# Patient Record
(demographics unavailable — no encounter records)

---

## 2024-11-05 NOTE — HISTORY OF PRESENT ILLNESS
[Currently In Menopause] : currently in menopause [Yes] : Patient has concerns regarding sex [Currently Active] : currently active [TextBox_4] : Dennys is a 58 y/o  who presents today for an annual exam. She has c/o pelvic pain that became more consistent about 2 months ago.  She states the pain, at it's worst is 5/10. She denies any abnormal vaginal discharge or odor.  At her annual last year she states rheum was going to order repeat DEXA as last was 2022 and noted osteoporosis. she states she has not yet had this but remains on Prolia with rheum [Mammogramdate] : 9/23/22 [ColonoscopyDate] : 1/2023 [TextBox_43] : REPEAT DUE SOON [FreeTextEntry1] : PAIN

## 2024-11-05 NOTE — DISCUSSION/SUMMARY
[FreeTextEntry1] : 1) pap performed 2) refill for vaginal estrogen issued with isntructions changed to 2-3x per week vs. 1-2x 3) rx for tV sono issued for eval of pelvic pain 4) rx for screening mammo issued  Return to office in one year, sooner prn

## 2024-11-05 NOTE — PHYSICAL EXAM
[Appropriately responsive] : appropriately responsive [Alert] : alert [No Acute Distress] : no acute distress [No Lymphadenopathy] : no lymphadenopathy [Oriented x3] : oriented x3 [Examination Of The Breasts] : a normal appearance [No Discharge] : no discharge [No Masses] : no breast masses were palpable [Vulvar Atrophy] : vulvar atrophy [Labia Majora] : normal [Labia Minora] : normal [Atrophy] : atrophy [Normal] : normal [Uterine Adnexae] : normal

## 2025-01-07 NOTE — REVIEW OF SYSTEMS
[Recent Weight Loss (___ Lbs)] : recent [unfilled] ~Ulb weight loss [Heart Rate Is Slow] : slow heart rate [Chest Pain] : chest pain [Shortness Of Breath] : shortness of breath [Orthopnea] : orthopnea [Abdominal Pain] : abdominal pain [Constipation] : constipation [Diarrhea] : diarrhea [Heartburn] : heartburn [Dysuria] : dysuria [Pelvic Pain] : pelvic pain [As Noted in HPI] : as noted in HPI [Arthralgias] : arthralgias [Joint Pain] : joint pain [Joint Stiffness] : joint stiffness [Difficulty Walking] : difficulty walking [Sleep Disturbances] : sleep disturbances [Anxiety] : anxiety [Depression] : depression [Muscle Weakness] : muscle weakness [Negative] : Psychiatric [Fever] : no fever [Chills] : no chills [Eye Pain] : no eye pain [Loss Of Hearing] : no hearing loss [Palpitations] : no palpitations [Cough] : no cough [SOB on Exertion] : no shortness of breath during exertion [Joint Swelling] : no joint swelling [Skin Lesions] : no skin lesions [Limb Weakness] : no limb weakness [Easy Bruising] : no tendency for easy bruising [FreeTextEntry2] : 86 lb weight loss since April 2022 intentional -- l - wt stable [FreeTextEntry3] : denies inflammatory eye [FreeTextEntry5] : ?? CHB, need to review ECG- CV w/u in progress [FreeTextEntry6] : chronic desaturation while sleeping, requires HS O2 - not discussed today [FreeTextEntry7] : abdominal pain still present in LUQ, but is significantly better than previously- RESOLVED- and now reports BRBPR  [FreeTextEntry8] : renal calculi and dyspareunia - not discussed  [de-identified] : dry cracked skin on finger tips / and feet.. intermittently- NO psoriasis.. no nail pitting, mild onycholysis, and hyperkeratosis++ worse in feet  [FreeTextEntry9] : NEW R 4PIP dactylitis only here appreciated though continues pain "everywhere"  [de-identified] : due to pain, balance issues

## 2025-01-07 NOTE — REVIEW OF SYSTEMS
[Recent Weight Loss (___ Lbs)] : recent [unfilled] ~Ulb weight loss [Heart Rate Is Slow] : slow heart rate [Chest Pain] : chest pain [Shortness Of Breath] : shortness of breath [Orthopnea] : orthopnea [Abdominal Pain] : abdominal pain [Constipation] : constipation [Diarrhea] : diarrhea [Heartburn] : heartburn [Dysuria] : dysuria [Pelvic Pain] : pelvic pain [As Noted in HPI] : as noted in HPI [Arthralgias] : arthralgias [Joint Pain] : joint pain [Joint Stiffness] : joint stiffness [Difficulty Walking] : difficulty walking [Sleep Disturbances] : sleep disturbances [Anxiety] : anxiety [Depression] : depression [Muscle Weakness] : muscle weakness [Negative] : Psychiatric [Fever] : no fever [Chills] : no chills [Eye Pain] : no eye pain [Loss Of Hearing] : no hearing loss [Palpitations] : no palpitations [Cough] : no cough [SOB on Exertion] : no shortness of breath during exertion [Joint Swelling] : no joint swelling [Skin Lesions] : no skin lesions [Limb Weakness] : no limb weakness [Easy Bruising] : no tendency for easy bruising [FreeTextEntry2] : 86 lb weight loss since April 2022 intentional -- l - wt stable [FreeTextEntry3] : denies inflammatory eye [FreeTextEntry5] : ?? CHB, need to review ECG- CV w/u in progress [FreeTextEntry6] : chronic desaturation while sleeping, requires HS O2 - not discussed today [FreeTextEntry7] : abdominal pain still present in LUQ, but is significantly better than previously- RESOLVED- and now reports BRBPR  [FreeTextEntry8] : renal calculi and dyspareunia - not discussed  [FreeTextEntry9] : NEW R 4PIP dactylitis only here appreciated though continues pain "everywhere"  [de-identified] : dry cracked skin on finger tips / and feet.. intermittently- NO psoriasis.. no nail pitting, mild onycholysis, and hyperkeratosis++ worse in feet  [de-identified] : due to pain, balance issues

## 2025-01-07 NOTE — DATA REVIEWED
[FreeTextEntry1] : Labs \par  3/10/2023 nl CBC, CMP, Fe, Ferritin, uric acid, TFTs, A1c, ESR, B12, Vit D, neg RF\par  \par  12/7/2022 liver biopsy: minimal steatosis w/iron deposition within kupffer cells..no evidence of inflammatory process nor fibrosis \par  \par  12/7/2022 omentum biopsy: larger regions of fat necrosis w/inflammatory and giant cell reaction and extensive calcium deposits \par  \par  12/22 nl IGG 4 levels but total IGG low at 369, nl Interleukin 1, 6, C3/4, and TFTs w/ neg SILVIA, dsDNA, Walt, SSA/B, ANCA/PR3/MPO, ASCA, ACE, APS/ LAC (except + LAC by silica) \par  \par  10/21 neg to include tTG, CCP, RF, SILVIA, SS, dsDNA, Hep B/ C, w/ nl C3/4 (see below low immunoglobulins) \par  \par  7/21 nl CMP except low Protein 5.4, TFTs, Hb 7.6 w/ MCV 78, WBC 4.1 w/ plt 325, vit D 36\par  \par  10/20 comprehensive rheum studies neg\par  nl CBC, CMP, ESR 30-34 (repeatedly)/ CRP, C3/4, TSH, PTH, CK, SPEP, PTT w/ neg RF/ CCP, SILVIA and subserologies to include APS/ LAC, SCL70, CHAN, ACE, TPO/ TG, HLAB27,Vit D, uric acid, UA\par  Labs: \par  \par  \par  imaging: \par  - MR L hip 10/20  with moderate to severe insertional gluteus min/ medius.. hamstring tendinosis w/ small joint effusion, mild GTB.. \par  \par  - MR hands 10/20 w/ mild changes c/w OA \par  \par  - Xrays 10/20 Hands, hips. SI joints and knees nl (evidence of ACL repair L knee)\par
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
## 2025-01-07 NOTE — PHYSICAL EXAM
[General Appearance - Alert] : alert [General Appearance - Well Nourished] : well nourished [General Appearance - Well Developed] : well developed [Sclera] : the sclera and conjunctiva were normal [Outer Ear] : the ears and nose were normal in appearance [Nasal Cavity] : the nasal mucosa and septum were normal [Neck Appearance] : the appearance of the neck was normal [Respiration, Rhythm And Depth] : normal respiratory rhythm and effort [Auscultation Breath Sounds / Voice Sounds] : lungs were clear to auscultation bilaterally [Heart Rate And Rhythm] : heart rate was normal and rhythm regular [Heart Sounds] : normal S1 and S2 [Heart Sounds Gallop] : no gallops [Murmurs] : no murmurs [Heart Sounds Pericardial Friction Rub] : no pericardial rub [Arterial Pulses Femoral] : femoral pulses were normal without bruits [Abdomen Soft] : soft [Cervical Lymph Nodes Enlarged Posterior Bilaterally] : posterior cervical [Cervical Lymph Nodes Enlarged Anterior Bilaterally] : anterior cervical [Supraclavicular Lymph Nodes Enlarged Bilaterally] : supraclavicular [No CVA Tenderness] : no ~M costovertebral angle tenderness [No Spinal Tenderness] : no spinal tenderness [Skin Color & Pigmentation] : normal skin color and pigmentation [Skin Turgor] : normal skin turgor [] : no rash [Sensation] : the sensory exam was normal to light touch and pinprick [No Focal Deficits] : no focal deficits [Oriented To Time, Place, And Person] : oriented to person, place, and time [Impaired Insight] : insight and judgment were intact [Affect] : the affect was normal [Abnormal Walk] : normal gait [Musculoskeletal - Swelling] : no joint swelling seen [Motor Tone] : muscle strength and tone were normal [FreeTextEntry1] : Active inflammation of hands improved today. Previously: R 4 PIP overt swelling pain- dactylitis.. - despite pain does have nearly fROM neck, shoulders, elbows, wrists, hands, hips, knees, ankles and feet. - ++ tender points "everywhere" worse over spine; BL achilles TTP - fullness
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
## 2025-02-09 NOTE — REVIEW OF SYSTEMS
[Recent Weight Loss (___ Lbs)] : recent [unfilled] ~Ulb weight loss [Heart Rate Is Slow] : slow heart rate [Chest Pain] : chest pain [Shortness Of Breath] : shortness of breath [Orthopnea] : orthopnea [Abdominal Pain] : abdominal pain [Constipation] : constipation [Diarrhea] : diarrhea [Heartburn] : heartburn [Dysuria] : dysuria [Pelvic Pain] : pelvic pain [As Noted in HPI] : as noted in HPI [Arthralgias] : arthralgias [Joint Pain] : joint pain [Joint Stiffness] : joint stiffness [Difficulty Walking] : difficulty walking [Sleep Disturbances] : sleep disturbances [Anxiety] : anxiety [Depression] : depression [Muscle Weakness] : muscle weakness [Negative] : Psychiatric [Fever] : no fever [Chills] : no chills [Eye Pain] : no eye pain [Loss Of Hearing] : no hearing loss [Palpitations] : no palpitations [Cough] : no cough [SOB on Exertion] : no shortness of breath during exertion [Joint Swelling] : no joint swelling [Skin Lesions] : no skin lesions [Limb Weakness] : no limb weakness [Easy Bruising] : no tendency for easy bruising [FreeTextEntry2] : 86 lb weight loss since April 2022 intentional -- l - wt stable [FreeTextEntry3] : denies inflammatory eye [FreeTextEntry5] : ?? CHB, need to review ECG- CV w/u in progress [FreeTextEntry6] : chronic desaturation while sleeping, requires HS O2 - not discussed today [FreeTextEntry7] : abdominal pain still present in LUQ, but is significantly better than previously- RESOLVED- and now reports BRBPR  [FreeTextEntry8] : renal calculi and dyspareunia - not discussed  [FreeTextEntry9] : NEW R 4PIP dactylitis only here appreciated though continues pain "everywhere"  [de-identified] : dry cracked skin on finger tips / and feet.. intermittently- NO psoriasis.. no nail pitting, mild onycholysis, and hyperkeratosis++ worse in feet  [de-identified] : due to pain, balance issues
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
## 2025-02-09 NOTE — REVIEW OF SYSTEMS
[Recent Weight Loss (___ Lbs)] : recent [unfilled] ~Ulb weight loss [Heart Rate Is Slow] : slow heart rate [Chest Pain] : chest pain [Shortness Of Breath] : shortness of breath [Orthopnea] : orthopnea [Abdominal Pain] : abdominal pain [Constipation] : constipation [Diarrhea] : diarrhea [Heartburn] : heartburn [Dysuria] : dysuria [Pelvic Pain] : pelvic pain [As Noted in HPI] : as noted in HPI [Arthralgias] : arthralgias [Joint Pain] : joint pain [Joint Stiffness] : joint stiffness [Difficulty Walking] : difficulty walking [Sleep Disturbances] : sleep disturbances [Anxiety] : anxiety [Depression] : depression [Muscle Weakness] : muscle weakness [Negative] : Psychiatric [Fever] : no fever [Chills] : no chills [Eye Pain] : no eye pain [Loss Of Hearing] : no hearing loss [Palpitations] : no palpitations [Cough] : no cough [SOB on Exertion] : no shortness of breath during exertion [Joint Swelling] : no joint swelling [Skin Lesions] : no skin lesions [Limb Weakness] : no limb weakness [Easy Bruising] : no tendency for easy bruising [FreeTextEntry2] : 86 lb weight loss since April 2022 intentional -- l - wt stable [FreeTextEntry3] : denies inflammatory eye [FreeTextEntry5] : ?? CHB, need to review ECG- CV w/u in progress [FreeTextEntry6] : chronic desaturation while sleeping, requires HS O2 - not discussed today [FreeTextEntry7] : abdominal pain still present in LUQ, but is significantly better than previously- RESOLVED- and now reports BRBPR  [FreeTextEntry8] : renal calculi and dyspareunia - not discussed  [FreeTextEntry9] : NEW R 4PIP dactylitis only here appreciated though continues pain "everywhere"  [de-identified] : dry cracked skin on finger tips / and feet.. intermittently- NO psoriasis.. no nail pitting, mild onycholysis, and hyperkeratosis++ worse in feet  [de-identified] : due to pain, balance issues

## 2025-02-09 NOTE — DATA REVIEWED
[Y] : Patient is sexually active [N] : Patient denies prior pregnancies [Currently Active] : currently active [Men] : men [Vaginal] : vaginal [No] : No [Patient refuses STI testing] : Patient refuses STI testing [Menarche Age: ____] : age at menarche was [unfilled] [Mammogramdate] : 12/11/20 [FreeTextEntry1] : Labs \par  3/10/2023 nl CBC, CMP, Fe, Ferritin, uric acid, TFTs, A1c, ESR, B12, Vit D, neg RF\par  \par  12/7/2022 liver biopsy: minimal steatosis w/iron deposition within kupffer cells..no evidence of inflammatory process nor fibrosis \par  \par  12/7/2022 omentum biopsy: larger regions of fat necrosis w/inflammatory and giant cell reaction and extensive calcium deposits \par  \par  12/22 nl IGG 4 levels but total IGG low at 369, nl Interleukin 1, 6, C3/4, and TFTs w/ neg SILVIA, dsDNA, Walt, SSA/B, ANCA/PR3/MPO, ASCA, ACE, APS/ LAC (except + LAC by silica) \par  \par  10/21 neg to include tTG, CCP, RF, SILVIA, SS, dsDNA, Hep B/ C, w/ nl C3/4 (see below low immunoglobulins) \par  \par  7/21 nl CMP except low Protein 5.4, TFTs, Hb 7.6 w/ MCV 78, WBC 4.1 w/ plt 325, vit D 36\par  \par  10/20 comprehensive rheum studies neg\par  nl CBC, CMP, ESR 30-34 (repeatedly)/ CRP, C3/4, TSH, PTH, CK, SPEP, PTT w/ neg RF/ CCP, SILVIA and subserologies to include APS/ LAC, SCL70, CHAN, ACE, TPO/ TG, HLAB27,Vit D, uric acid, UA\par  Labs: \par  \par  \par  imaging: \par  - MR L hip 10/20  with moderate to severe insertional gluteus min/ medius.. hamstring tendinosis w/ small joint effusion, mild GTB.. \par  \par  - MR hands 10/20 w/ mild changes c/w OA \par  \par  - Xrays 10/20 Hands, hips. SI joints and knees nl (evidence of ACL repair L knee)\par   [TextBox_19] : rt breast birads 2 [BreastSonogramDate] : 11/27/20 [TextBox_25] : birads 0 letter sent [PapSmeardate] : 10/9/20 [HPVDate] : 10/9/20 [TextBox_31] : wnl [TextBox_78] : neg [LMPDate] : 09/30/21 [MensesFreq] : 28 [MensesLength] : 5-7 [FreeTextEntry1] : 9/30/21
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
## 2025-02-09 NOTE — HISTORY OF PRESENT ILLNESS
[FreeTextEntry1] : 2/7/25  Updated Colonoscopy nothing of concern.. did struggle with the prep- actually had to go twice.. no repeat needed for 3 ys Abd CTScan - needed for concern of abd pain at site of bypass... but did fine some changes in lower lung fields - still diverticulosis but no infection  - cannot consider gabapentin. - wt back to baseline but did just use go lightly... for colonoscopy prep..  - continues to struggle with constipation/ diarrhea.. and recent severe Norovirus..   Meds Taking Tymlos every day - Labs 10/9/24: collagen type 1 C-telo 1401pg/mL # 6 pens thus far  We have not renewed Lyrica - interested in switching to Gabapentin - she resists this and would prefer Lyrica, thinks it works best Takes 400mg Lyrica per day - 100mg morning, 100mg afternoon, 200mg evening Down from 15mg Mounjaro to 12.5mg (~2mo ago)- OFF completely 3 wks ... now and planning to restart in next few months.  Still takes 25mg spironolactone for edema Topiramate 1x per day 50mg Bupropion 300mg Takes 2x 10mg Cyclobenzaprine at night Recently completed course of iron infusion (Injectafer, finished 11/24  - has lichen sclerosus of vaginal area...previously was on medication and the issue resolved, but active now    1) Chronic pain / FM chronic opiates (Kratom)  2) Obesity s/P RyGB complicated but ventral hernia / SBO 12/22  3) TONY- recurrent    ______________________________________________________________________ f/u visit 55-year-old  female with a history of depression initially seen in October of 2016 with a history of a positive SILVIA and to rule out possible autoimmune disease. At that time she was ruled out for an autoimmune process but was noted to have a sedimentation rate of 35 and a CRP which was mildly elevated. Since that initial evaluation she is now off methadone, she admits to using an over-the-counter pain reliever called KRATOM. She is using oxycodone and upto motrin 800 mg tid.   She admits to joint pains, fatigue, poor sleep, prolonged morning stiffness, night sweats. She denies alopecia, oral ulcers, sicca symptoms or Raynauds. She denies psoriasis or colitis.She has now resumed lyrica with some relief.  She is concerned about the inflammatory markers and about her underlying symptoms. She is concerned about the lack of energy and the constant pain that she is in. She rates her current pain and fatigue at a 7/10. She denies fevers chills or night sweats. She has an average appetite without any weight loss. She has had an extensive work up with me her labs reveal A negative SILVIA, all specific serologies are negative and her sedimentation rate is in the 30s which is normal for her age and gender. She had imaging done as well which is more consistent with osteoarthritis and inflammatory arthritis. She had a bone density test that was read as osteoporosis. Besides being postmenopausal she does not have any other risk factors for osteoporosis.

## 2025-03-03 NOTE — HISTORY OF PRESENT ILLNESS
[FreeTextEntry1] : Patient at home, has consented to telehealth video visit today  Provider:  Ze Cassidy [facility] [id number]  Verbal consent given on 03/03/2025 at 09:20 by ERIC IQBAL, ~  ~. Teledoc  - R4 PIP, R5PIP/DIP no other joint involvement.. noted intermittently for past couple of months- most recent episode started 10 days ago.. similar experience few months ago- steroids in past ++ response, felt like "wonder woman" Nov '24 and 6/16...Back pain worse when finger swells.... worse at night.. not severe in am.. worse with activity..  XR pelvis/ hips:  mild- to moderate R and mild Left SI arthrosis - no obvious erosion  CTscan lung:  repeat update ordered but pending auth- mild pleurisy on R side but no  pending repeat HRCT, abdominal CT 1/25 with GGO noted in base lungs.. pending full CTscan dedicated to chest lung.  Colonoscopy over 1 months ago - still pending  ?? parasites:  working with Dr Cordova from [facility].. . this is likely reason for anemia, not seen on colonoscopy ordered Emverm.. confirmed with stool sample/ visually seen- previous episode 1 year ago. Also in eye/ mouth.  Precise organism pending.  Still no appetite and continues to have constipation (2/2 [place])  Meds Taking Tymlos every day - Labs 10/9/24: collagen type 1 C-telo 1401pg/mL # 6 pens thus far - need to check  We have not renewed Lyrica - interested in switching to Gabapentin - she resists this and would prefer Lyrica, thinks it works best Takes 400mg Lyrica per day - 100mg morning, 100mg afternoon, 200mg evening Down from 15mg Mounjaro to 12.5mg (~2mo ago)- OFF completely 3 wks ... now and planning to restart in next few months.  Still takes 25mg spironolactone for edema Topiramate 1x per day 50mg Bupropion 300mg Takes 2x 10mg Cyclobenzaprine at night Recently completed course of iron infusion (Injectafer, finished 11/24  - has lichen sclerosus of vaginal area...previously was on medication and the issue resolved, but active now    1) Chronic pain / FM chronic opiates (Kratom)  2) Obesity s/P RyGB complicated but ventral hernia / SBO 12/22  3) TONY- recurrent    ______________________________________________________________________ f/u visit 55-year-old  female with a history of depression initially seen in October of 2016 with a history of a positive SILVIA and to rule out possible autoimmune disease. At that time she was ruled out for an autoimmune process but was noted to have a sedimentation rate of 35 and a CRP which was mildly elevated. Since that initial evaluation she is now off methadone, she admits to using an over-the-counter pain reliever called KRATOM. She is using oxycodone and upto motrin 800 mg tid.   She admits to joint pains, fatigue, poor sleep, prolonged morning stiffness, night sweats. She denies alopecia, oral ulcers, sicca symptoms or Raynauds. She denies psoriasis or colitis.She has now resumed lyrica with some relief.  She is concerned about the inflammatory markers and about her underlying symptoms. She is concerned about the lack of energy and the constant pain that she is in. She rates her current pain and fatigue at a 7/10. She denies fevers chills or night sweats. She has an average appetite without any weight loss. She has had an extensive work up with me her labs reveal A negative SILVIA, all specific serologies are negative and her sedimentation rate is in the 30s which is normal for her age and gender. She had imaging done as well which is more consistent with osteoarthritis and inflammatory arthritis. She had a bone density test that was read as osteoporosis. Besides being postmenopausal she does not have any other risk factors for osteoporosis.

## 2025-03-03 NOTE — REVIEW OF SYSTEMS
[Recent Weight Loss (___ Lbs)] : recent [unfilled] ~Ulb weight loss [Heart Rate Is Slow] : slow heart rate [Chest Pain] : chest pain [Shortness Of Breath] : shortness of breath [Orthopnea] : orthopnea [Abdominal Pain] : abdominal pain [Constipation] : constipation [Diarrhea] : diarrhea [Heartburn] : heartburn [Dysuria] : dysuria [Pelvic Pain] : pelvic pain [As Noted in HPI] : as noted in HPI [Arthralgias] : arthralgias [Joint Pain] : joint pain [Joint Stiffness] : joint stiffness [Difficulty Walking] : difficulty walking [Sleep Disturbances] : sleep disturbances [Anxiety] : anxiety [Depression] : depression [Muscle Weakness] : muscle weakness [Negative] : Psychiatric [Fever] : no fever [Chills] : no chills [Eye Pain] : no eye pain [Loss Of Hearing] : no hearing loss [Palpitations] : no palpitations [Cough] : no cough [SOB on Exertion] : no shortness of breath during exertion [Joint Swelling] : no joint swelling [Skin Lesions] : no skin lesions [Limb Weakness] : no limb weakness [Easy Bruising] : no tendency for easy bruising [FreeTextEntry2] : 86 lb weight loss since April 2022 intentional -- l - wt stable [FreeTextEntry3] : denies inflammatory eye [FreeTextEntry5] : ?? CHB, need to review ECG- CV w/u in progress [FreeTextEntry6] : chronic desaturation while sleeping, requires HS O2 - not discussed today [FreeTextEntry7] : abdominal pain still present in LUQ, but is significantly better than previously- RESOLVED- and now reports BRBPR  [FreeTextEntry8] : renal calculi and dyspareunia - not discussed  [FreeTextEntry9] : NEW R 4PIP dactylitis only here appreciated though continues pain "everywhere"  [de-identified] : dry cracked skin on finger tips / and feet.. intermittently- NO psoriasis.. no nail pitting, mild onycholysis, and hyperkeratosis++ worse in feet  [de-identified] : due to pain, balance issues

## 2025-03-03 NOTE — DATA REVIEWED
[FreeTextEntry1] : Labs  3/10/2023 nl CBC, CMP, Fe, Ferritin, uric acid, TFTs, A1c, ESR, B12, Vit D, neg RF  2022 liver biopsy: minimal steatosis w/iron deposition within kupffer cells..no evidence of inflammatory process nor fibrosis   2022 omentum biopsy: larger regions of fat necrosis w/inflammatory and giant cell reaction and extensive calcium deposits    nl IGG 4 levels but total IGG low at 369, nl Interleukin 1, 6, C3/4, and TFTs w/ neg SILVIA, dsDNA, Walt, SSA/B, ANCA/PR3/MPO, ASCA, ACE, APS/ LAC (except + LAC by silica)   10/21 neg to include tTG, CCP, RF, SILVIA, SS, dsDNA, Hep B/ C, w/ nl C3/4 (see below low immunoglobulins)    nl CMP except low Protein 5.4, TFTs, Hb 7.6 w/ MCV 78, WBC 4.1 w/ plt 325, vit D 36  10/20 comprehensive rheum studies neg nl CBC, CMP, ESR 30-34 (repeatedly)/ CRP, C3/4, TSH, PTH, CK, SPEP, PTT w/ neg RF/ CCP, SILVIA and subserologies to include APS/ LAC, SCL70, CHAN, ACE, TPO/ TG, HLAB27,Vit D, uric acid, UA Labs:    [id number] NEG for compression fractures, extensive multi level DJD with varying degrees of narrowinand osteophytes worse posterior facet arthrosis.. marked kyphosis, unremarkable SI joints and generalized osteopenia   - MR L hip 10/20  with moderate to severe insertional gluteus min/ medius.. hamstring tendinosis w/ small joint effusion, mild GTB..   - MR hands 10/20 w/ mild changes c/w OA   - Xrays 10/20 Hands, hips. SI joints and knees nl (evidence of ACL repair L knee)
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
## 2025-03-03 NOTE — HISTORY OF PRESENT ILLNESS
[FreeTextEntry1] : Patient at home, has consented to telehealth video visit today  Provider:  Ze Cassidy [facility] [id number]  Verbal consent given on 03/03/2025 at 09:20 by ERIC IQBAL, ~  ~. Teledoc  - R4 PIP, R5PIP/DIP no other joint involvement.. noted intermittently for past couple of months- most recent episode started 10 days ago.. similar experience few months ago- steroids in past ++ response, felt like "wonder woman" Nov '24 and 6/16...Back pain worse when finger swells.... worse at night.. not severe in am.. worse with activity..  XR pelvis/ hips:  mild- to moderate R and mild Left SI arthrosis - no obvious erosion  CTscan lung:  repeat update ordered but pending auth- mild pleurisy on R side but no  pending repeat HRCT, abdominal CT 1/25 with GGO noted in base lungs.. pending full CTscan dedicated to chest lung.  Colonoscopy over 1 months ago - still pending  ?? parasites:  working with Dr Cordova from [facility].. . this is likely reason for anemia, not seen on colonoscopy ordered Emverm.. confirmed with stool sample/ visually seen- previous episode 1 year ago. Also in eye/ mouth.  Precise organism pending.  Still no appetite and continues to have constipation (2/2 [place]dom)  Meds Taking Tymlos every day - Labs 10/9/24: collagen type 1 C-telo 1401pg/mL # 6 pens thus far - need to check  We have not renewed Lyrica - interested in switching to Gabapentin - she resists this and would prefer Lyrica, thinks it works best Takes 400mg Lyrica per day - 100mg morning, 100mg afternoon, 200mg evening Down from 15mg Mounjaro to 12.5mg (~2mo ago)- OFF completely 3 wks ... now and planning to restart in next few months.  Still takes 25mg spironolactone for edema Topiramate 1x per day 50mg Bupropion 300mg Takes 2x 10mg Cyclobenzaprine at night Recently completed course of iron infusion (Injectafer, finished 11/24  - has lichen sclerosus of vaginal area...previously was on medication and the issue resolved, but active now    1) Chronic pain / FM chronic opiates (Kratom)  2) Obesity s/P RyGB complicated but ventral hernia / SBO 12/22  3) TONY- recurrent    ______________________________________________________________________ f/u visit 55-year-old  female with a history of depression initially seen in October of 2016 with a history of a positive SILVIA and to rule out possible autoimmune disease. At that time she was ruled out for an autoimmune process but was noted to have a sedimentation rate of 35 and a CRP which was mildly elevated. Since that initial evaluation she is now off methadone, she admits to using an over-the-counter pain reliever called KRATOM. She is using oxycodone and upto motrin 800 mg tid.   She admits to joint pains, fatigue, poor sleep, prolonged morning stiffness, night sweats. She denies alopecia, oral ulcers, sicca symptoms or Raynauds. She denies psoriasis or colitis.She has now resumed lyrica with some relief.  She is concerned about the inflammatory markers and about her underlying symptoms. She is concerned about the lack of energy and the constant pain that she is in. She rates her current pain and fatigue at a 7/10. She denies fevers chills or night sweats. She has an average appetite without any weight loss. She has had an extensive work up with me her labs reveal A negative SILVIA, all specific serologies are negative and her sedimentation rate is in the 30s which is normal for her age and gender. She had imaging done as well which is more consistent with osteoarthritis and inflammatory arthritis. She had a bone density test that was read as osteoporosis. Besides being postmenopausal she does not have any other risk factors for osteoporosis.

## 2025-03-03 NOTE — REVIEW OF SYSTEMS
[Recent Weight Loss (___ Lbs)] : recent [unfilled] ~Ulb weight loss [Heart Rate Is Slow] : slow heart rate [Chest Pain] : chest pain [Shortness Of Breath] : shortness of breath [Orthopnea] : orthopnea [Abdominal Pain] : abdominal pain [Constipation] : constipation [Diarrhea] : diarrhea [Heartburn] : heartburn [Dysuria] : dysuria [Pelvic Pain] : pelvic pain [As Noted in HPI] : as noted in HPI [Arthralgias] : arthralgias [Joint Pain] : joint pain [Joint Stiffness] : joint stiffness [Difficulty Walking] : difficulty walking [Sleep Disturbances] : sleep disturbances [Anxiety] : anxiety [Depression] : depression [Muscle Weakness] : muscle weakness [Negative] : Psychiatric [Fever] : no fever [Chills] : no chills [Eye Pain] : no eye pain [Loss Of Hearing] : no hearing loss [Palpitations] : no palpitations [Cough] : no cough [SOB on Exertion] : no shortness of breath during exertion [Joint Swelling] : no joint swelling [Skin Lesions] : no skin lesions [Limb Weakness] : no limb weakness [Easy Bruising] : no tendency for easy bruising [FreeTextEntry2] : 86 lb weight loss since April 2022 intentional -- l - wt stable [FreeTextEntry3] : denies inflammatory eye [FreeTextEntry5] : ?? CHB, need to review ECG- CV w/u in progress [FreeTextEntry6] : chronic desaturation while sleeping, requires HS O2 - not discussed today [FreeTextEntry7] : abdominal pain still present in LUQ, but is significantly better than previously- RESOLVED- and now reports BRBPR  [FreeTextEntry8] : renal calculi and dyspareunia - not discussed  [FreeTextEntry9] : NEW R 4PIP dactylitis only here appreciated though continues pain "everywhere"  [de-identified] : dry cracked skin on finger tips / and feet.. intermittently- NO psoriasis.. no nail pitting, mild onycholysis, and hyperkeratosis++ worse in feet  [de-identified] : due to pain, balance issues

## 2025-03-03 NOTE — DATA REVIEWED
[FreeTextEntry1] : Labs  3/10/2023 nl CBC, CMP, Fe, Ferritin, uric acid, TFTs, A1c, ESR, B12, Vit D, neg RF  2022 liver biopsy: minimal steatosis w/iron deposition within kupffer cells..no evidence of inflammatory process nor fibrosis   2022 omentum biopsy: larger regions of fat necrosis w/inflammatory and giant cell reaction and extensive calcium deposits    nl IGG 4 levels but total IGG low at 369, nl Interleukin 1, 6, C3/4, and TFTs w/ neg SILIVA, dsDNA, Walt, SSA/B, ANCA/PR3/MPO, ASCA, ACE, APS/ LAC (except + LAC by silica)   10/21 neg to include tTG, CCP, RF, SILVIA, SS, dsDNA, Hep B/ C, w/ nl C3/4 (see below low immunoglobulins)    nl CMP except low Protein 5.4, TFTs, Hb 7.6 w/ MCV 78, WBC 4.1 w/ plt 325, vit D 36  10/20 comprehensive rheum studies neg nl CBC, CMP, ESR 30-34 (repeatedly)/ CRP, C3/4, TSH, PTH, CK, SPEP, PTT w/ neg RF/ CCP, SILVIA and subserologies to include APS/ LAC, SCL70, CHAN, ACE, TPO/ TG, HLAB27,Vit D, uric acid, UA Labs:    [id number] NEG for compression fractures, extensive multi level DJD with varying degrees of narrowinand osteophytes worse posterior facet arthrosis.. marked kyphosis, unremarkable SI joints and generalized osteopenia   - MR L hip 10/20  with moderate to severe insertional gluteus min/ medius.. hamstring tendinosis w/ small joint effusion, mild GTB..   - MR hands 10/20 w/ mild changes c/w OA   - Xrays 10/20 Hands, hips. SI joints and knees nl (evidence of ACL repair L knee)
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
## 2025-04-14 NOTE — DATA REVIEWED
[FreeTextEntry1] : Labs  3/10/2023 nl CBC, CMP, Fe, Ferritin, uric acid, TFTs, A1c, ESR, B12, Vit D, neg RF  12/7/2022 liver biopsy: minimal steatosis w/iron deposition within kupffer cells..no evidence of inflammatory process nor fibrosis   12/7/2022 omentum biopsy: larger regions of fat necrosis w/inflammatory and giant cell reaction and extensive calcium deposits   12/22 nl IGG 4 levels but total IGG low at 369, nl Interleukin 1, 6, C3/4, and TFTs w/ neg SILVIA, dsDNA, Walt, SSA/B, ANCA/PR3/MPO, ASCA, ACE, APS/ LAC (except + LAC by silica)   10/21 neg to include tTG, CCP, RF, SILVIA, SS, dsDNA, Hep B/ C, w/ nl C3/4 (see below low immunoglobulins)   7/21 nl CMP except low Protein 5.4, TFTs, Hb 7.6 w/ MCV 78, WBC 4.1 w/ plt 325, vit D 36  10/20 comprehensive rheum studies neg nl CBC, CMP, ESR 30-34 (repeatedly)/ CRP, C3/4, TSH, PTH, CK, SPEP, PTT w/ neg RF/ CCP, SILVIA and subserologies to include APS/ LAC, SCL70, CHAN, ACE, TPO/ TG, HLAB27,Vit D, uric acid, UA Labs:    imaging:  T spine XR 3/7/25 T 10-11 vertebral fx, age indeterminant MR ordered Hand XR 3/7/25 totally nl    1/16/25 NEG for compression fractures, extensive multi level DJD with varying degrees of narrowinand osteophytes worse posterior facet arthrosis.. marked kyphosis, unremarkable SI joints and generalized osteopenia   - MR L hip 10/20  with moderate to severe insertional gluteus min/ medius.. hamstring tendinosis w/ small joint effusion, mild GTB..   - MR hands 10/20 w/ mild changes c/w OA   - Xrays 10/20 Hands, hips. SI joints and knees nl (evidence of ACL repair L knee)

## 2025-04-14 NOTE — REVIEW OF SYSTEMS
[Recent Weight Loss (___ Lbs)] : recent [unfilled] ~Ulb weight loss [Heart Rate Is Slow] : slow heart rate [Chest Pain] : chest pain [Shortness Of Breath] : shortness of breath [Orthopnea] : orthopnea [Abdominal Pain] : abdominal pain [Constipation] : constipation [Diarrhea] : diarrhea [Heartburn] : heartburn [Dysuria] : dysuria [Pelvic Pain] : pelvic pain [As Noted in HPI] : as noted in HPI [Arthralgias] : arthralgias [Joint Pain] : joint pain [Joint Stiffness] : joint stiffness [Difficulty Walking] : difficulty walking [Sleep Disturbances] : sleep disturbances [Anxiety] : anxiety [Depression] : depression [Muscle Weakness] : muscle weakness [Negative] : Psychiatric [Fever] : no fever [Chills] : no chills [Eye Pain] : no eye pain [Loss Of Hearing] : no hearing loss [Palpitations] : no palpitations [Cough] : no cough [SOB on Exertion] : no shortness of breath during exertion [Joint Swelling] : no joint swelling [Skin Lesions] : no skin lesions [Limb Weakness] : no limb weakness [Easy Bruising] : no tendency for easy bruising [FreeTextEntry2] : 86 lb weight loss since April 2022 intentional -- l - wt stable [FreeTextEntry3] : denies inflammatory eye [FreeTextEntry5] : ?? CHB, need to review ECG- CV w/u in progress [FreeTextEntry6] : chronic desaturation while sleeping, requires HS O2 - not discussed today [FreeTextEntry7] : abdominal pain still present in LUQ, but is significantly better than previously- RESOLVED- and now reports BRBPR  [FreeTextEntry8] : renal calculi and dyspareunia - not discussed  [FreeTextEntry9] : NEW R 4PIP dactylitis only here appreciated though continues pain "everywhere"  [de-identified] : dry cracked skin on finger tips / and feet.. intermittently- NO psoriasis.. no nail pitting, mild onycholysis, and hyperkeratosis++ worse in feet  [de-identified] : due to pain, balance issues

## 2025-04-14 NOTE — HISTORY OF PRESENT ILLNESS
[FreeTextEntry1] : Patient at home, has consented to telehealth video visit today  Provider:  Ze Cassidy [facility] [id number]  Verbal consent given on 04/14/2025 at 09:29 by ERIC IQBAL, ~  ~. Teledoc failed, doximity   Updated imaging..  T spine XR 3/7/25 T 10-11 vertebral fx, age indeterminant MR ordered Hand XR 3/7/25 totally nl  - again with active parasitic infection-  Ivermectin 3 tabs twice day- NOT effective.  Previously Albendazole for 2 wks.. Very anxious about the return.. looking to connect to ID, tried to reach [facility] provider, no answer Dr Thomas  - still pending HRCT  - Continues to have inflammation but fully controlled on 10 mg prednisone,. tolerating fine...  - Continues to take TYmlos daily..  Still no appetite and continues to have constipation (2/2 [place]dom)  Meds Taking Tymlos every day - Labs 10/9/24: collagen type 1 C-telo 1401pg/mL # 6 pens thus far - need to check  We have not renewed Lyrica - interested in switching to Gabapentin - she resists this and would prefer Lyrica, thinks it works best Takes 400mg Lyrica per day - 100mg morning, 100mg afternoon, 200mg evening Down from 15mg Mounjaro to 12.5mg (~2mo ago)- OFF completely 3 wks ... now and planning to restart in next few months.  Still takes 25mg spironolactone for edema Topiramate 1x per day 50mg Bupropion 300mg Takes 2x 10mg Cyclobenzaprine at night Recently completed course of iron infusion (Injectafer, finished 11/24  - has lichen sclerosus of vaginal area...previously was on medication and the issue resolved, but active now    1) Chronic pain / FM chronic opiates (Kratom)  2) Obesity s/P RyGB complicated but ventral hernia / SBO 12/22  3) TONY- recurrent    ______________________________________________________________________ f/u visit 55-year-old  female with a history of depression initially seen in October of 2016 with a history of a positive SILVIA and to rule out possible autoimmune disease. At that time she was ruled out for an autoimmune process but was noted to have a sedimentation rate of 35 and a CRP which was mildly elevated. Since that initial evaluation she is now off methadone, she admits to using an over-the-counter pain reliever called KRATOM. She is using oxycodone and upto motrin 800 mg tid.   She admits to joint pains, fatigue, poor sleep, prolonged morning stiffness, night sweats. She denies alopecia, oral ulcers, sicca symptoms or Raynauds. She denies psoriasis or colitis.She has now resumed lyrica with some relief.  She is concerned about the inflammatory markers and about her underlying symptoms. She is concerned about the lack of energy and the constant pain that she is in. She rates her current pain and fatigue at a 7/10. She denies fevers chills or night sweats. She has an average appetite without any weight loss. She has had an extensive work up with me her labs reveal A negative SILVIA, all specific serologies are negative and her sedimentation rate is in the 30s which is normal for her age and gender. She had imaging done as well which is more consistent with osteoarthritis and inflammatory arthritis. She had a bone density test that was read as osteoporosis. Besides being postmenopausal she does not have any other risk factors for osteoporosis.

## 2025-04-14 NOTE — PHYSICAL EXAM
[General Appearance - Alert] : alert [General Appearance - Well Nourished] : well nourished [General Appearance - Well Developed] : well developed [] : no respiratory distress [Respiration, Rhythm And Depth] : normal respiratory rhythm and effort [Oriented To Time, Place, And Person] : oriented to person, place, and time [Impaired Insight] : insight and judgment were intact [Affect] : the affect was normal [Abnormal Walk] : normal gait [Musculoskeletal - Swelling] : no joint swelling seen [Motor Tone] : muscle strength and tone were normal [Exaggerated Use Of Accessory Muscles For Inspiration] : no accessory muscle use [FreeTextEntry1] : No active inflammation on VIDEO -today- Last visit.. Active inflammation of hands improved today. R 4 PIP overt swelling pain- dactylitis.. - despite pain does have nearly fROM neck, shoulders, elbows, wrists, hands, hips, knees, ankles and feet. - ++ tender points "everywhere" worse over spine; BL achilles TTP - fullness

## 2025-05-12 NOTE — REVIEW OF SYSTEMS
[Recent Weight Loss (___ Lbs)] : recent [unfilled] ~Ulb weight loss [Heart Rate Is Slow] : slow heart rate [Chest Pain] : chest pain [Shortness Of Breath] : shortness of breath [Orthopnea] : orthopnea [Abdominal Pain] : abdominal pain [Constipation] : constipation [Diarrhea] : diarrhea [Heartburn] : heartburn [Dysuria] : dysuria [Pelvic Pain] : pelvic pain [As Noted in HPI] : as noted in HPI [Arthralgias] : arthralgias [Joint Pain] : joint pain [Joint Stiffness] : joint stiffness [Difficulty Walking] : difficulty walking [Sleep Disturbances] : sleep disturbances [Anxiety] : anxiety [Depression] : depression [Muscle Weakness] : muscle weakness [Negative] : Psychiatric [Fever] : no fever [Chills] : no chills [Eye Pain] : no eye pain [Loss Of Hearing] : no hearing loss [Palpitations] : no palpitations [Cough] : no cough [SOB on Exertion] : no shortness of breath during exertion [Joint Swelling] : no joint swelling [Skin Lesions] : no skin lesions [Limb Weakness] : no limb weakness [Easy Bruising] : no tendency for easy bruising [FreeTextEntry2] : 86 lb weight loss since April 2022 intentional -- l - wt stable [FreeTextEntry3] : denies inflammatory eye [FreeTextEntry5] : ?? CHB, need to review ECG- CV w/u in progress [FreeTextEntry6] : chronic desaturation while sleeping, requires HS O2 - not discussed today [FreeTextEntry7] : abdominal pain still present in LUQ, but is significantly better than previously- RESOLVED- and now reports BRBPR  [FreeTextEntry8] : renal calculi and dyspareunia - not discussed  [FreeTextEntry9] : NEW R 4PIP dactylitis only here appreciated though continues pain "everywhere"  [de-identified] : dry cracked skin on finger tips / and feet.. intermittently- NO psoriasis.. no nail pitting, mild onycholysis, and hyperkeratosis++ worse in feet  [de-identified] : due to pain, balance issues
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
## 2025-05-12 NOTE — HISTORY OF PRESENT ILLNESS
[FreeTextEntry1] : Patient at home, has consented to telehealth video visit today  Provider:  Ze Cassidy [facility] [id number]  Verbal consent given on 05/12/2025 at 10:31 by ERIC IQBAL, ~  ~. Teledoc failed, doximity   Updated imaging..  T spine XR 3/7/25 T 10-11 vertebral fx, age indeterminant MR ordered Hand XR 3/7/25 totally nl  - again struggling with issues r/t  parasitic infection- despite course of  Ivermectin 3 tabs twice day- NOT effective.  Previously Albendazole for 2 wks.. Very anxious about the return.. looking to connect to ID, tried to reach [facility] provider, no answer Dr Thomas -> not able to get appointment.  Working with GI at [facility] - previously Dr Sanderson (met with Dr Thakkar)...and continues to have severe abd pain. Stool samples pending at [place]- and convinced that has parasites in pulmonary secretions.. updated HRCT 4/25 reportedly abn (need to review).... previously with Dr Samuel.. .. Did have labs with PCP- Dr Cunningham -> need to review.  In past no clear evidence of eosinophilia  Back pain persists.. but nothing acute today.. - Continues with bupropion 300 mg q d...but continues to be depressed.  Suicidal ideations, though declines specific plan.  Does have supportive neighbors/ ... but has 4 dogs, Will call neighbor - Camila Goodson. Emotional about current condition and issues involved with providers NOT believing her regarding parasitic infection.  Would like to resum 450 mg wellbutrin.   - Continued on  10 mg prednisone..... tolerating fine...was supposed to taper to 5 mg but not done..  continues at this point... dramatic response.. feeling much better.  - Continues to take TYmlos daily..  Still no appetite and continues to have constipation (2/2 Kraydom)  Meds Taking Tymlos every day - Labs 10/9/24: collagen type 1 C-telo 1401pg/mL # 6 pens thus far - need to check  We have not renewed Lyrica - interested in switching to Gabapentin - she resists this and would prefer Lyrica, thinks it works best Takes 400mg Lyrica per day - 100mg morning, 100mg afternoon, 200mg evening Down from 15mg Mounjaro to 12.5mg (~2mo ago)- OFF completely 3 wks ... now and planning to restart in next few months but still taking phenteramine Still takes 25mg spironolactone for edema Topiramate 1x per day 50mg Bupropion 300mg Takes 2x 10mg Cyclobenzaprine at night  - has lichen sclerosus of vaginal area...previously was on medication and the issue resolved, but active now    1) Chronic pain / FM chronic opiates (Kratom)  2) Obesity s/P RyGB complicated but ventral hernia / SBO 12/22  3) TONY- recurrent  4) Recurrent/ persistent parasitic infection ______________________________________________________________________ f/u visit 55-year-old  female with a history of depression initially seen in October of 2016 with a history of a positive SILVIA and to rule out possible autoimmune disease. At that time she was ruled out for an autoimmune process but was noted to have a sedimentation rate of 35 and a CRP which was mildly elevated. Since that initial evaluation she is now off methadone, she admits to using an over-the-counter pain reliever called KRATOM. She is using oxycodone and upto motrin 800 mg tid.   She admits to joint pains, fatigue, poor sleep, prolonged morning stiffness, night sweats. She denies alopecia, oral ulcers, sicca symptoms or Raynauds. She denies psoriasis or colitis.She has now resumed lyrica with some relief.  She is concerned about the inflammatory markers and about her underlying symptoms. She is concerned about the lack of energy and the constant pain that she is in. She rates her current pain and fatigue at a 7/10. She denies fevers chills or night sweats. She has an average appetite without any weight loss. She has had an extensive work up with me her labs reveal A negative SILVIA, all specific serologies are negative and her sedimentation rate is in the 30s which is normal for her age and gender. She had imaging done as well which is more consistent with osteoarthritis and inflammatory arthritis. She had a bone density test that was read as osteoporosis. Besides being postmenopausal she does not have any other risk factors for osteoporosis.

## 2025-05-12 NOTE — REVIEW OF SYSTEMS
[Recent Weight Loss (___ Lbs)] : recent [unfilled] ~Ulb weight loss [Heart Rate Is Slow] : slow heart rate [Chest Pain] : chest pain [Shortness Of Breath] : shortness of breath [Orthopnea] : orthopnea [Abdominal Pain] : abdominal pain [Constipation] : constipation [Diarrhea] : diarrhea [Heartburn] : heartburn [Dysuria] : dysuria [Pelvic Pain] : pelvic pain [As Noted in HPI] : as noted in HPI [Arthralgias] : arthralgias [Joint Pain] : joint pain [Joint Stiffness] : joint stiffness [Difficulty Walking] : difficulty walking [Sleep Disturbances] : sleep disturbances [Anxiety] : anxiety [Depression] : depression [Muscle Weakness] : muscle weakness [Negative] : Psychiatric [Fever] : no fever [Chills] : no chills [Eye Pain] : no eye pain [Loss Of Hearing] : no hearing loss [Palpitations] : no palpitations [Cough] : no cough [SOB on Exertion] : no shortness of breath during exertion [Joint Swelling] : no joint swelling [Skin Lesions] : no skin lesions [Limb Weakness] : no limb weakness [Easy Bruising] : no tendency for easy bruising [FreeTextEntry2] : 86 lb weight loss since April 2022 intentional -- l - wt stable [FreeTextEntry3] : denies inflammatory eye [FreeTextEntry5] : ?? CHB, need to review ECG- CV w/u in progress [FreeTextEntry6] : chronic desaturation while sleeping, requires HS O2 - not discussed today [FreeTextEntry7] : abdominal pain still present in LUQ, but is significantly better than previously- RESOLVED- and now reports BRBPR  [FreeTextEntry8] : renal calculi and dyspareunia - not discussed  [FreeTextEntry9] : NEW R 4PIP dactylitis only here appreciated though continues pain "everywhere"  [de-identified] : dry cracked skin on finger tips / and feet.. intermittently- NO psoriasis.. no nail pitting, mild onycholysis, and hyperkeratosis++ worse in feet  [de-identified] : due to pain, balance issues

## 2025-05-12 NOTE — PHYSICAL EXAM
[General Appearance - Alert] : alert [General Appearance - Well Nourished] : well nourished [General Appearance - Well Developed] : well developed [] : no respiratory distress [Respiration, Rhythm And Depth] : normal respiratory rhythm and effort [Exaggerated Use Of Accessory Muscles For Inspiration] : no accessory muscle use [Oriented To Time, Place, And Person] : oriented to person, place, and time [Impaired Insight] : insight and judgment were intact [Affect] : the affect was normal [Abnormal Walk] : normal gait [Musculoskeletal - Swelling] : no joint swelling seen [Motor Tone] : muscle strength and tone were normal [Outer Ear] : the ears and nose were normal in appearance [Examination Of The Oral Cavity] : the lips and gums were normal [FreeTextEntry1] : No active inflammation on VIDEO -today- Last visit.. Active inflammation of hands improved today. R 4 PIP overt swelling pain- dactylitis.. - despite pain does have nearly fROM neck, shoulders, elbows, wrists, hands, hips, knees, ankles and feet. - ++ tender points "everywhere" worse over spine; BL achilles TTP - fullness

## 2025-05-12 NOTE — HISTORY OF PRESENT ILLNESS
[FreeTextEntry1] : Patient at home, has consented to telehealth video visit today  Provider:  Ze Cassidy [facility] [id number]  Verbal consent given on 05/12/2025 at 10:31 by ERIC IQBAL, ~  ~. Teledoc failed, doximity   Updated imaging..  T spine XR 3/7/25 T 10-11 vertebral fx, age indeterminant MR ordered Hand XR 3/7/25 totally nl  - again struggling with issues r/t  parasitic infection- despite course of  Ivermectin 3 tabs twice day- NOT effective.  Previously Albendazole for 2 wks.. Very anxious about the return.. looking to connect to ID, tried to reach [facility] provider, no answer Dr Thomas -> not able to get appointment.  Working with GI at [facility] - previously Dr Sanderson (met with Dr Thakkar)...and continues to have severe abd pain. Stool samples pending at [place]- and convinced that has parasites in pulmonary secretions.. updated HRCT 4/25 reportedly abn (need to review).... previously with Dr Samuel.. .. Did have labs with PCP- Dr Cunningham -> need to review.  In past no clear evidence of eosinophilia  Back pain persists.. but nothing acute today.. - Continues with bupropion 300 mg q d...but continues to be depressed.  Suicidal ideations, though declines specific plan.  Does have supportive neighbors/ ... but has 4 dogs, Will call neighbor - Camila Goodson. Emotional about current condition and issues involved with providers NOT believing her regarding parasitic infection.  Would like to resum 450 mg wellbutrin.   - Continued on  10 mg prednisone..... tolerating fine...was supposed to taper to 5 mg but not done..  continues at this point... dramatic response.. feeling much better.  - Continues to take TYmlos daily..  Still no appetite and continues to have constipation (2/2 Kraydom)  Meds Taking Tymlos every day - Labs 10/9/24: collagen type 1 C-telo 1401pg/mL # 6 pens thus far - need to check  We have not renewed Lyrica - interested in switching to Gabapentin - she resists this and would prefer Lyrica, thinks it works best Takes 400mg Lyrica per day - 100mg morning, 100mg afternoon, 200mg evening Down from 15mg Mounjaro to 12.5mg (~2mo ago)- OFF completely 3 wks ... now and planning to restart in next few months but still taking phenteramine Still takes 25mg spironolactone for edema Topiramate 1x per day 50mg Bupropion 300mg Takes 2x 10mg Cyclobenzaprine at night  - has lichen sclerosus of vaginal area...previously was on medication and the issue resolved, but active now    1) Chronic pain / FM chronic opiates (Kratom)  2) Obesity s/P RyGB complicated but ventral hernia / SBO 12/22  3) TONY- recurrent  4) Recurrent/ persistent parasitic infection ______________________________________________________________________ f/u visit 55-year-old  female with a history of depression initially seen in October of 2016 with a history of a positive SILVIA and to rule out possible autoimmune disease. At that time she was ruled out for an autoimmune process but was noted to have a sedimentation rate of 35 and a CRP which was mildly elevated. Since that initial evaluation she is now off methadone, she admits to using an over-the-counter pain reliever called KRATOM. She is using oxycodone and upto motrin 800 mg tid.   She admits to joint pains, fatigue, poor sleep, prolonged morning stiffness, night sweats. She denies alopecia, oral ulcers, sicca symptoms or Raynauds. She denies psoriasis or colitis.She has now resumed lyrica with some relief.  She is concerned about the inflammatory markers and about her underlying symptoms. She is concerned about the lack of energy and the constant pain that she is in. She rates her current pain and fatigue at a 7/10. She denies fevers chills or night sweats. She has an average appetite without any weight loss. She has had an extensive work up with me her labs reveal A negative SILVIA, all specific serologies are negative and her sedimentation rate is in the 30s which is normal for her age and gender. She had imaging done as well which is more consistent with osteoarthritis and inflammatory arthritis. She had a bone density test that was read as osteoporosis. Besides being postmenopausal she does not have any other risk factors for osteoporosis.

## 2025-05-30 NOTE — CONSULT LETTER
[Dear  ___] : Dear  [unfilled], [FreeTextEntry2] : Eve Aguilar M.D. [address] [facility] [address] [place], [place] [id number] [Courtesy Letter:] : I had the pleasure of seeing your patient, [unfilled], in my office today. 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  The patient has ongoing neck and back pain with a significant new kyphosis.  The patient was evaluated by Dr. Harris in 2023 with similar symptoms but now her kyphosis is progressing.  At the time, in 2023, the patient was not a surgical candidate and had coronal imbalance and lordosis and conservative treatment was recommended.  Today the patient presents with pain in her neck that radiates into both shoulders.  The patient has noticed that her head developed kyphosis and her head is pitched forward.  Over the last 2 months she has noticed that this has worsened.  The patient has noticed that her right hand has developed some weakness.  She has trouble with coordination.  There is associated tingling and numbness in the right hand.  There is no trauma no upper extremity radicular pain.  The patient has a worsened gait over the last few months since the symptoms developed.  The patient also has lower back pain and right leg pain.  During the day the patient notices that her kyphosis has worsened and she is unable to stand erect.  The pain in her neck and back is rated as 6/10.  The patient reports a sharp stabbing achy pain in her back.  During the day she develops midthoracic pain along with her kyphosis.  The back pain does radiate into her pelvic region.  In addition the patient has chronic right hip pain.  The patient reports that her legs are weak.  She denies any falls or trips.  She reports an imbalance disturbance.  The patient has been dragging her right foot on occasion.  The patient is unable to walk distances at this time.  The patient utilizes a cane for ambulation.  The patient denies any bowel or bladder incontinence.  She had 1 episode where she was unable to get to the bathroom on time and had an accident but denies any saddle anesthesia.  The patient has underlying rheumatological disorder and is treated by Dr. Cochran's Bonnie's for fibromyalgia.  The patient is currently on Lyrica, prednisone and Advil.  The patient has known osteoporosis and receives injections for this disease.  The patient has undergone multiple conservative management treatments without any true relief.  The patient has had months of physical therapy and until a few months ago   she was able to stand upright.  The patient does take Ibuprofen PRN.     The patient engages in Yoga routinely and continues to engage in Yoga.  The patient attends [facility] and massage therapy as well.     There are no updated spine images.  On neurologic examination, the patient is alert and oriented.  Appears well and in no distress.  Speech clear and fluent.  The patient has cervical and thoracic kyphosis and difficulty with back motion.  There is stiffness of her neck and spine.  A thoracic hump is noted with a twist to the right side.  Dysmetria is present of UE and LE.  Weakness is noted of the left lower extremity with spasticity.  Reflexes are 1+ in all extremities and equal and symmetric throughout.  No Barrera sign bilaterally. Plantar reflexes are flexor.  JARED is intact.  No abnormal extraneous movements.  No clonus.  Romberg is absent.   Gait is ataxic with no devices.  She is not able to walk on heels and toes.   This 60-year-old female has progressive kyphosis.  Physical therapy is no longer beneficial.  I have recommended the patient undergo whole spine MRI of the cervical, thoracic and lumbar spine.  In addition I have ordered scoliosis x-rays to evaluate for worsening coronal imbalance and pelvic tilt.  The patient was provided a prescription for Valium 5 mg to be taken prior to the MRI images due to claustrophobia.  I recommended patient do not drive and side effects were reviewed with good understanding.  I have discussed some postural exercises that the patient should perform on a routine basis at home.  The patient should continue all of her other conservative management including juan luis chi and yoga with massage.  The patient will return back to our office with MRI images and see Dr. Pang in follow-up as desired.  If the patient has any progression of her symptoms or any concerns she will contact our office sooner.  She understands that bowel dysfunction bladder dysfunction could be a red flag sign or progressive weakness and she would present to the ED for these new symptoms.  Thank you for very kindly including me in the evaluation and treatment of your patient.  Please do not hesitate to contact me should you have any concerns or questions regarding the patients progressive kyphosis and proposed follow-up treatment and evaluation plan.  [FreeTextEntry3] : Kimberly Lombardo, DNP, NP Nurse Practitioner Neurosurgery  [facility] [place] [address] Assistant  [employer] [facility] [employer] email: [email or web address] P- [phone] F- [phone]

## 2025-05-30 NOTE — CONSULT LETTER
[Dear  ___] : Dear  [unfilled], [FreeTextEntry2] : Eve Aguilar M.D. [address] [facility] [address] [place], [place] [id number] [Courtesy Letter:] : I had the pleasure of seeing your patient, [unfilled], in my office today. 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  The patient has ongoing neck and back pain with a significant new kyphosis.  The patient was evaluated by Dr. Harris in 2023 with similar symptoms but now her kyphosis is progressing.  At the time, in 2023, the patient was not a surgical candidate and had coronal imbalance and lordosis and conservative treatment was recommended.  Today the patient presents with pain in her neck that radiates into both shoulders.  The patient has noticed that her head developed kyphosis and her head is pitched forward.  Over the last 2 months she has noticed that this has worsened.  The patient has noticed that her right hand has developed some weakness.  She has trouble with coordination.  There is associated tingling and numbness in the right hand.  There is no trauma no upper extremity radicular pain.  The patient has a worsened gait over the last few months since the symptoms developed.  The patient also has lower back pain and right leg pain.  During the day the patient notices that her kyphosis has worsened and she is unable to stand erect.  The pain in her neck and back is rated as 6/10.  The patient reports a sharp stabbing achy pain in her back.  During the day she develops midthoracic pain along with her kyphosis.  The back pain does radiate into her pelvic region.  In addition the patient has chronic right hip pain.  The patient reports that her legs are weak.  She denies any falls or trips.  She reports an imbalance disturbance.  The patient has been dragging her right foot on occasion.  The patient is unable to walk distances at this time.  The patient utilizes a cane for ambulation.  The patient denies any bowel or bladder incontinence.  She had 1 episode where she was unable to get to the bathroom on time and had an accident but denies any saddle anesthesia.  The patient has underlying rheumatological disorder and is treated by Dr. Cochran's Bonnie[facility] for fibromyalgia.  The patient is currently on Lyrica, prednisone and Advil.  The patient has known osteoporosis and receives injections for this disease.  The patient has undergone multiple conservative management treatments without any true relief.  The patient has had months of physical therapy and until a few months ago   she was able to stand upright.  The patient does take Ibuprofen PRN.     The patient engages in Yoga routinely and continues to engage in Yoga.  The patient attends [facility] and massage therapy as well.     There are no updated spine images.  On neurologic examination, the patient is alert and oriented.  Appears well and in no distress.  Speech clear and fluent.  The patient has cervical and thoracic kyphosis and difficulty with back motion.  There is stiffness of her neck and spine.  A thoracic hump is noted with a twist to the right side.  Dysmetria is present of UE and LE.  Weakness is noted of the left lower extremity with spasticity.  Reflexes are 1+ in all extremities and equal and symmetric throughout.  No Barrera sign bilaterally. Plantar reflexes are flexor.  JARED is intact.  No abnormal extraneous movements.  No clonus.  Romberg is absent.   Gait is ataxic with no devices.  She is not able to walk on heels and toes.   This 60-year-old female has progressive kyphosis.  Physical therapy is no longer beneficial.  I have recommended the patient undergo whole spine MRI of the cervical, thoracic and lumbar spine.  In addition I have ordered scoliosis x-rays to evaluate for worsening coronal imbalance and pelvic tilt.  The patient was provided a prescription for Valium 5 mg to be taken prior to the MRI images due to claustrophobia.  I recommended patient do not drive and side effects were reviewed with good understanding.  I have discussed some postural exercises that the patient should perform on a routine basis at home.  The patient should continue all of her other conservative management including juan luis chi and yoga with massage.  The patient will return back to our office with MRI images and see Dr. Pang in follow-up as desired.  If the patient has any progression of her symptoms or any concerns she will contact our office sooner.  She understands that bowel dysfunction bladder dysfunction could be a red flag sign or progressive weakness and she would present to the ED for these new symptoms.  Thank you for very kindly including me in the evaluation and treatment of your patient.  Please do not hesitate to contact me should you have any concerns or questions regarding the patients progressive kyphosis and proposed follow-up treatment and evaluation plan.  [FreeTextEntry3] : Kimberly Lombardo, DNP, NP Nurse Practitioner Neurosurgery  [facility] [place] [address] Assistant  [employer] [facility] [employer]ician [employer] email: [email or web address] P- [phone] F- [phone]

## 2025-05-30 NOTE — CONSULT LETTER
[Dear  ___] : Dear  [unfilled], [FreeTextEntry2] : Eve Aguilar M.D. [address] [facility] [address] [place], [place] [id number] [Courtesy Letter:] : I had the pleasure of seeing your patient, [unfilled], in my office today. 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  The patient has ongoing neck and back pain with a significant new kyphosis.  The patient was evaluated by Dr. Harris in 2023 with similar symptoms but now her kyphosis is progressing.  At the time, in 2023, the patient was not a surgical candidate and had coronal imbalance and lordosis and conservative treatment was recommended.  Today the patient presents with pain in her neck that radiates into both shoulders.  The patient has noticed that her head developed kyphosis and her head is pitched forward.  Over the last 2 months she has noticed that this has worsened.  The patient has noticed that her right hand has developed some weakness.  She has trouble with coordination.  There is associated tingling and numbness in the right hand.  There is no trauma no upper extremity radicular pain.  The patient has a worsened gait over the last few months since the symptoms developed.  The patient also has lower back pain and right leg pain.  During the day the patient notices that her kyphosis has worsened and she is unable to stand erect.  The pain in her neck and back is rated as 6/10.  The patient reports a sharp stabbing achy pain in her back.  During the day she develops midthoracic pain along with her kyphosis.  The back pain does radiate into her pelvic region.  In addition the patient has chronic right hip pain.  The patient reports that her legs are weak.  She denies any falls or trips.  She reports an imbalance disturbance.  The patient has been dragging her right foot on occasion.  The patient is unable to walk distances at this time.  The patient utilizes a cane for ambulation.  The patient denies any bowel or bladder incontinence.  She had 1 episode where she was unable to get to the bathroom on time and had an accident but denies any saddle anesthesia.  The patient has underlying rheumatological disorder and is treated by Dr. Cochran's Bonnie's for fibromyalgia.  The patient is currently on Lyrica, prednisone and Advil.  The patient has known osteoporosis and receives injections for this disease.  The patient has undergone multiple conservative management treatments without any true relief.  The patient has had months of physical therapy and until a few months ago   she was able to stand upright.  The patient does take Ibuprofen PRN.     The patient engages in Yoga routinely and continues to engage in Yoga.  The patient attends [facility] and massage therapy as well.     There are no updated spine images.  On neurologic examination, the patient is alert and oriented.  Appears well and in no distress.  Speech clear and fluent.  The patient has cervical and thoracic kyphosis and difficulty with back motion.  There is stiffness of her neck and spine.  A thoracic hump is noted with a twist to the right side.  Dysmetria is present of UE and LE.  Weakness is noted of the left lower extremity with spasticity.  Reflexes are 1+ in all extremities and equal and symmetric throughout.  No Barrera sign bilaterally. Plantar reflexes are flexor.  JARED is intact.  No abnormal extraneous movements.  No clonus.  Romberg is absent.   Gait is ataxic with no devices.  She is not able to walk on heels and toes.   This 60-year-old female has progressive kyphosis.  Physical therapy is no longer beneficial.  I have recommended the patient undergo whole spine MRI of the cervical, thoracic and lumbar spine.  In addition I have ordered scoliosis x-rays to evaluate for worsening coronal imbalance and pelvic tilt.  The patient was provided a prescription for Valium 5 mg to be taken prior to the MRI images due to claustrophobia.  I recommended patient do not drive and side effects were reviewed with good understanding.  I have discussed some postural exercises that the patient should perform on a routine basis at home.  The patient should continue all of her other conservative management including juan luis chi and yoga with massage.  The patient will return back to our office with MRI images and see Dr. Pang in follow-up as desired.  If the patient has any progression of her symptoms or any concerns she will contact our office sooner.  She understands that bowel dysfunction bladder dysfunction could be a red flag sign or progressive weakness and she would present to the ED for these new symptoms.  Thank you for very kindly including me in the evaluation and treatment of your patient.  Please do not hesitate to contact me should you have any concerns or questions regarding the patients progressive kyphosis and proposed follow-up treatment and evaluation plan.  [FreeTextEntry3] : Kimberly Lombardo, DNP, NP Nurse Practitioner Neurosurgery  [facility] [place] [address] Assistant  [employer] [facility] [employer]ician [employer] email: [email or web address] P- [phone] F- [phone]

## 2025-05-30 NOTE — CONSULT LETTER
[Dear  ___] : Dear  [unfilled], [FreeTextEntry2] : Eve Aguilar M.D. [address] [facility] [address] [place], [place] [id number] [Courtesy Letter:] : I had the pleasure of seeing your patient, [unfilled], in my office today. 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  The patient has ongoing neck and back pain with a significant new kyphosis.  The patient was evaluated by Dr. Harris in 2023 with similar symptoms but now her kyphosis is progressing.  At the time, in 2023, the patient was not a surgical candidate and had coronal imbalance and lordosis and conservative treatment was recommended.  Today the patient presents with pain in her neck that radiates into both shoulders.  The patient has noticed that her head developed kyphosis and her head is pitched forward.  Over the last 2 months she has noticed that this has worsened.  The patient has noticed that her right hand has developed some weakness.  She has trouble with coordination.  There is associated tingling and numbness in the right hand.  There is no trauma no upper extremity radicular pain.  The patient has a worsened gait over the last few months since the symptoms developed.  The patient also has lower back pain and right leg pain.  During the day the patient notices that her kyphosis has worsened and she is unable to stand erect.  The pain in her neck and back is rated as 6/10.  The patient reports a sharp stabbing achy pain in her back.  During the day she develops midthoracic pain along with her kyphosis.  The back pain does radiate into her pelvic region.  In addition the patient has chronic right hip pain.  The patient reports that her legs are weak.  She denies any falls or trips.  She reports an imbalance disturbance.  The patient has been dragging her right foot on occasion.  The patient is unable to walk distances at this time.  The patient utilizes a cane for ambulation.  The patient denies any bowel or bladder incontinence.  She had 1 episode where she was unable to get to the bathroom on time and had an accident but denies any saddle anesthesia.  The patient has underlying rheumatological disorder and is treated by Dr. Cochran's Bonnie[facility] for fibromyalgia.  The patient is currently on Lyrica, prednisone and Advil.  The patient has known osteoporosis and receives injections for this disease.  The patient has undergone multiple conservative management treatments without any true relief.  The patient has had months of physical therapy and until a few months ago   she was able to stand upright.  The patient does take Ibuprofen PRN.     The patient engages in Yoga routinely and continues to engage in Yoga.  The patient attends [facility] and massage therapy as well.     There are no updated spine images.  On neurologic examination, the patient is alert and oriented.  Appears well and in no distress.  Speech clear and fluent.  The patient has cervical and thoracic kyphosis and difficulty with back motion.  There is stiffness of her neck and spine.  A thoracic hump is noted with a twist to the right side.  Dysmetria is present of UE and LE.  Weakness is noted of the left lower extremity with spasticity.  Reflexes are 1+ in all extremities and equal and symmetric throughout.  No Barrera sign bilaterally. Plantar reflexes are flexor.  JARED is intact.  No abnormal extraneous movements.  No clonus.  Romberg is absent.   Gait is ataxic with no devices.  She is not able to walk on heels and toes.   This 60-year-old female has progressive kyphosis.  Physical therapy is no longer beneficial.  I have recommended the patient undergo whole spine MRI of the cervical, thoracic and lumbar spine.  In addition I have ordered scoliosis x-rays to evaluate for worsening coronal imbalance and pelvic tilt.  The patient was provided a prescription for Valium 5 mg to be taken prior to the MRI images due to claustrophobia.  I recommended patient do not drive and side effects were reviewed with good understanding.  I have discussed some postural exercises that the patient should perform on a routine basis at home.  The patient should continue all of her other conservative management including juan luis chi and yoga with massage.  The patient will return back to our office with MRI images and see Dr. Pang in follow-up as desired.  If the patient has any progression of her symptoms or any concerns she will contact our office sooner.  She understands that bowel dysfunction bladder dysfunction could be a red flag sign or progressive weakness and she would present to the ED for these new symptoms.  Thank you for very kindly including me in the evaluation and treatment of your patient.  Please do not hesitate to contact me should you have any concerns or questions regarding the patients progressive kyphosis and proposed follow-up treatment and evaluation plan.  [FreeTextEntry3] : Kimberly Lombardo, DNP, NP Nurse Practitioner Neurosurgery  [facility] [place] [address] Assistant  [facility] [employer] Physician Assistant [employer] email: [email or web address] P- [phone] F- [phone]

## 2025-05-30 NOTE — CONSULT LETTER
[Dear  ___] : Dear  [unfilled], [FreeTextEntry2] : Eve Aguilar M.D. [address] [facility] [address] [place], [place] [id number] [Courtesy Letter:] : I had the pleasure of seeing your patient, [unfilled], in my office today. 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  The patient has ongoing neck and back pain with a significant new kyphosis.  The patient was evaluated by Dr. Harris in 2023 with similar symptoms but now her kyphosis is progressing.  At the time, in 2023, the patient was not a surgical candidate and had coronal imbalance and lordosis and conservative treatment was recommended.  Today the patient presents with pain in her neck that radiates into both shoulders.  The patient has noticed that her head developed kyphosis and her head is pitched forward.  Over the last 2 months she has noticed that this has worsened.  The patient has noticed that her right hand has developed some weakness.  She has trouble with coordination.  There is associated tingling and numbness in the right hand.  There is no trauma no upper extremity radicular pain.  The patient has a worsened gait over the last few months since the symptoms developed.  The patient also has lower back pain and right leg pain.  During the day the patient notices that her kyphosis has worsened and she is unable to stand erect.  The pain in her neck and back is rated as 6/10.  The patient reports a sharp stabbing achy pain in her back.  During the day she develops midthoracic pain along with her kyphosis.  The back pain does radiate into her pelvic region.  In addition the patient has chronic right hip pain.  The patient reports that her legs are weak.  She denies any falls or trips.  She reports an imbalance disturbance.  The patient has been dragging her right foot on occasion.  The patient is unable to walk distances at this time.  The patient utilizes a cane for ambulation.  The patient denies any bowel or bladder incontinence.  She had 1 episode where she was unable to get to the bathroom on time and had an accident but denies any saddle anesthesia.  The patient has underlying rheumatological disorder and is treated by Dr. Cochran's Bonnie[facility] for fibromyalgia.  The patient is currently on Lyrica, prednisone and Advil.  The patient has known osteoporosis and receives injections for this disease.  The patient has undergone multiple conservative management treatments without any true relief.  The patient has had months of physical therapy and until a few months ago   she was able to stand upright.  The patient does take Ibuprofen PRN.     The patient engages in Yoga routinely and continues to engage in Yoga.  The patient attends [facility] and massage therapy as well.     There are no updated spine images.  On neurologic examination, the patient is alert and oriented.  Appears well and in no distress.  Speech clear and fluent.  The patient has cervical and thoracic kyphosis and difficulty with back motion.  There is stiffness of her neck and spine.  A thoracic hump is noted with a twist to the right side.  Dysmetria is present of UE and LE.  Weakness is noted of the left lower extremity with spasticity.  Reflexes are 1+ in all extremities and equal and symmetric throughout.  No Barrera sign bilaterally. Plantar reflexes are flexor.  JARED is intact.  No abnormal extraneous movements.  No clonus.  Romberg is absent.   Gait is ataxic with no devices.  She is not able to walk on heels and toes.   This 60-year-old female has progressive kyphosis.  Physical therapy is no longer beneficial.  I have recommended the patient undergo whole spine MRI of the cervical, thoracic and lumbar spine.  In addition I have ordered scoliosis x-rays to evaluate for worsening coronal imbalance and pelvic tilt.  The patient was provided a prescription for Valium 5 mg to be taken prior to the MRI images due to claustrophobia.  I recommended patient do not drive and side effects were reviewed with good understanding.  I have discussed some postural exercises that the patient should perform on a routine basis at home.  The patient should continue all of her other conservative management including juan luis chi and yoga with massage.  The patient will return back to our office with MRI images and see Dr. Pang in follow-up as desired.  If the patient has any progression of her symptoms or any concerns she will contact our office sooner.  She understands that bowel dysfunction bladder dysfunction could be a red flag sign or progressive weakness and she would present to the ED for these new symptoms.  Thank you for very kindly including me in the evaluation and treatment of your patient.  Please do not hesitate to contact me should you have any concerns or questions regarding the patients progressive kyphosis and proposed follow-up treatment and evaluation plan.  [FreeTextEntry3] : Kimberly Lombardo, DNP, NP Nurse Practitioner Neurosurgery  [facility] [place] [address] Assistant  [employer] [facility] [employer] email: [email or web address] P- [phone] F- [phone]

## 2025-06-26 NOTE — HISTORY OF PRESENT ILLNESS
[FreeTextEntry1] : 6/25/25  Currently on 5 mg prednisone absolutely feels worse on lower dose.. specifically in hands/ hips/ sI joint.. with worsening - most severe L4-5 PIP and several toes on 10 mg prednisone felt great, almost normal despite years of chronic pain.  Did not get to pulm and didn't get PFTs as requested.   didn't get US hands  Needs updated labs now.  5/3/25 nl CBC w/ Hb 11.3, plt 336, A1c 5.2, nl TSH, B12 445, vit D 31, nl CMP - ?? CPAP - cyclobenzaprine??? had requested higher dose wellbutrin 1 m ago- previously not tolerated at 450 mg.... but has actually now stopped fully.. had been on 300 mg.   - Continue Tymlos for 22 mnths more.. though 6/25 likely - report immediately any acute renal calculi/ may need to consider renal US..  - concerned about mood- still.. needs to work with psych, but considerable family issues "weakness"  - f/u GI/ hem onc for persistent Anemia.. and updated scans as needed - f/u regarding GI w/u and wt / and chronic recurrent parasitic infection (is this active, should see ID if +, not mentioned today- didn't get to new ID)  - repeatedly encouraged to taper off Kratom but attempts to taper in past failed, brief trial oxy, not effective recently.  Will continue on current regimen... continues w/ MM as well- from [place]  - did speak with Dr Monzon... no significant change.. agrees with assessments as noted below and also attempting to get patient off Kratom Updated imaging..  T spine XR 3/7/25 T 10-11 vertebral fx, age indeterminant MR ordered Hand XR 3/7/25 totally nl   Acute parasitic infection:  again struggling with issues r/t  parasitic infection- despite course of  Ivermectin 3 tabs twice day- NOT effective.  Previously Albendazole for 2 wks.. Very anxious about the return.. looking to connect to ID, tried to reach [facility] provider, no answer Dr Thomas -> not able to get appointment.  Working with GI at [facility] - Abd pain improved.  Actually had nearly 10+ wt gain. Has stopped Mounjaro and phenteramine.   Meds Taking Tymlos every day - Labs 10/9/24: collagen type 1 C-telo 1401pg/mL # 6 pens thus far - need to check  We have not renewed Lyrica - interested in switching to Gabapentin - she resists this and would prefer Lyrica, thinks it works best Takes 400mg Lyrica per day - 100mg morning, 100mg afternoon, 200mg evening Still takes 25mg spironolactone for edema Topiramate 1x per day 50mg Takes 2x 10mg Cyclobenzaprine at night  - has lichen sclerosus of vaginal area...previously was on medication and the issue resolved, but active now    1) Chronic pain / FM chronic opiates (Kratom)  2) Obesity s/P RyGB complicated but ventral hernia / SBO 12/22  3) TONY- recurrent  4) Recurrent/ persistent parasitic infection ______________________________________________________________________ f/u visit 55-year-old  female with a history of depression initially seen in October of 2016 with a history of a positive SILVIA and to rule out possible autoimmune disease. At that time she was ruled out for an autoimmune process but was noted to have a sedimentation rate of 35 and a CRP which was mildly elevated. Since that initial evaluation she is now off methadone, she admits to using an over-the-counter pain reliever called KRATOM. She is using oxycodone and upto motrin 800 mg tid.   She admits to joint pains, fatigue, poor sleep, prolonged morning stiffness, night sweats. She denies alopecia, oral ulcers, sicca symptoms or Raynauds. She denies psoriasis or colitis.She has now resumed lyrica with some relief.  She is concerned about the inflammatory markers and about her underlying symptoms. She is concerned about the lack of energy and the constant pain that she is in. She rates her current pain and fatigue at a 7/10. She denies fevers chills or night sweats. She has an average appetite without any weight loss. She has had an extensive work up with me her labs reveal A negative SILVIA, all specific serologies are negative and her sedimentation rate is in the 30s which is normal for her age and gender. She had imaging done as well which is more consistent with osteoarthritis and inflammatory arthritis. She had a bone density test that was read as osteoporosis. Besides being postmenopausal she does not have any other risk factors for osteoporosis.

## 2025-06-26 NOTE — PHYSICAL EXAM
[General Appearance - Alert] : alert [General Appearance - Well Nourished] : well nourished [General Appearance - Well Developed] : well developed [Outer Ear] : the ears and nose were normal in appearance [Examination Of The Oral Cavity] : the lips and gums were normal [] : no respiratory distress [Respiration, Rhythm And Depth] : normal respiratory rhythm and effort [Exaggerated Use Of Accessory Muscles For Inspiration] : no accessory muscle use [Oriented To Time, Place, And Person] : oriented to person, place, and time [Impaired Insight] : insight and judgment were intact [Affect] : the affect was normal [Abnormal Walk] : normal gait [Musculoskeletal - Swelling] : no joint swelling seen [Motor Tone] : muscle strength and tone were normal [Auscultation Breath Sounds / Voice Sounds] : lungs were clear to auscultation bilaterally [Heart Rate And Rhythm] : heart rate was normal and rhythm regular [Heart Sounds] : normal S1 and S2 [Heart Sounds Gallop] : no gallops [Murmurs] : no murmurs [Heart Sounds Pericardial Friction Rub] : no pericardial rub [Cervical Lymph Nodes Enlarged Posterior Bilaterally] : posterior cervical [Cervical Lymph Nodes Enlarged Anterior Bilaterally] : anterior cervical [Supraclavicular Lymph Nodes Enlarged Bilaterally] : supraclavicular [FreeTextEntry1] : R MCP/ MTP TTP, R 4-5 PIP exquisite pain and bogginess - warmth- obvious swelling;  - Last visit.. Active inflammation of hands improved today. R 4 PIP overt swelling pain- dactylitis.. - despite pain does have nearly fROM neck, shoulders, elbows, wrists, hands, hips, knees, ankles and feet. - ++ tender points "everywhere" worse over spine; BL achilles TTP - fullness
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
## 2025-06-26 NOTE — HISTORY OF PRESENT ILLNESS
[FreeTextEntry1] : 6/25/25  Currently on 5 mg prednisone absolutely feels worse on lower dose.. specifically in hands/ hips/ sI joint.. with worsening - most severe L4-5 PIP and several toes on 10 mg prednisone felt great, almost normal despite years of chronic pain.  Did not get to pulm and didn't get PFTs as requested.   didn't get US hands  Needs updated labs now.  5/3/25 nl CBC w/ Hb 11.3, plt 336, A1c 5.2, nl TSH, B12 445, vit D 31, nl CMP - ?? CPAP - cyclobenzaprine??? had requested higher dose wellbutrin 1 m ago- previously not tolerated at 450 mg.... but has actually now stopped fully.. had been on 300 mg.   - Continue Tymlos for 22 mnths more.. though 6/25 likely - report immediately any acute renal calculi/ may need to consider renal US..  - concerned about mood- still.. needs to work with psych, but considerable family issues "weakness"  - f/u GI/ hem onc for persistent Anemia.. and updated scans as needed - f/u regarding GI w/u and wt / and chronic recurrent parasitic infection (is this active, should see ID if +, not mentioned today- didn't get to new ID)  - repeatedly encouraged to taper off Kratom but attempts to taper in past failed, brief trial oxy, not effective recently.  Will continue on current regimen... continues w/ MM as well- from [place]  - did speak with Dr Monzon... no significant change.. agrees with assessments as noted below and also attempting to get patient off Kratom Updated imaging..  T spine XR 3/7/25 T 10-11 vertebral fx, age indeterminant MR ordered Hand XR 3/7/25 totally nl   Acute parasitic infection:  again struggling with issues r/t  parasitic infection- despite course of  Ivermectin 3 tabs twice day- NOT effective.  Previously Albendazole for 2 wks.. Very anxious about the return.. looking to connect to ID, tried to reach [facility] provider, no answer Dr Thomas -> not able to get appointment.  Working with GI at [facility] - Abd pain improved.  Actually had nearly 10+ wt gain. Has stopped Mounjaro and phenteramine.   Meds Taking Tymlos every day - Labs 10/9/24: collagen type 1 C-telo 1401pg/mL # 6 pens thus far - need to check  We have not renewed Lyrica - interested in switching to Gabapentin - she resists this and would prefer Lyrica, thinks it works best Takes 400mg Lyrica per day - 100mg morning, 100mg afternoon, 200mg evening Still takes 25mg spironolactone for edema Topiramate 1x per day 50mg Takes 2x 10mg Cyclobenzaprine at night  - has lichen sclerosus of vaginal area...previously was on medication and the issue resolved, but active now    1) Chronic pain / FM chronic opiates (Kratom)  2) Obesity s/P RyGB complicated but ventral hernia / SBO 12/22  3) TONY- recurrent  4) Recurrent/ persistent parasitic infection ______________________________________________________________________ f/u visit 55-year-old  female with a history of depression initially seen in October of 2016 with a history of a positive SILVIA and to rule out possible autoimmune disease. At that time she was ruled out for an autoimmune process but was noted to have a sedimentation rate of 35 and a CRP which was mildly elevated. Since that initial evaluation she is now off methadone, she admits to using an over-the-counter pain reliever called KRATOM. She is using oxycodone and upto motrin 800 mg tid.   She admits to joint pains, fatigue, poor sleep, prolonged morning stiffness, night sweats. She denies alopecia, oral ulcers, sicca symptoms or Raynauds. She denies psoriasis or colitis.She has now resumed lyrica with some relief.  She is concerned about the inflammatory markers and about her underlying symptoms. She is concerned about the lack of energy and the constant pain that she is in. She rates her current pain and fatigue at a 7/10. She denies fevers chills or night sweats. She has an average appetite without any weight loss. She has had an extensive work up with me her labs reveal A negative SILVIA, all specific serologies are negative and her sedimentation rate is in the 30s which is normal for her age and gender. She had imaging done as well which is more consistent with osteoarthritis and inflammatory arthritis. She had a bone density test that was read as osteoporosis. Besides being postmenopausal she does not have any other risk factors for osteoporosis.

## 2025-06-26 NOTE — REVIEW OF SYSTEMS
[Recent Weight Loss (___ Lbs)] : recent [unfilled] ~Ulb weight loss [Heart Rate Is Slow] : slow heart rate [Chest Pain] : chest pain [Shortness Of Breath] : shortness of breath [Orthopnea] : orthopnea [Abdominal Pain] : abdominal pain [Constipation] : constipation [Diarrhea] : diarrhea [Heartburn] : heartburn [Dysuria] : dysuria [Pelvic Pain] : pelvic pain [As Noted in HPI] : as noted in HPI [Arthralgias] : arthralgias [Joint Pain] : joint pain [Joint Stiffness] : joint stiffness [Difficulty Walking] : difficulty walking [Sleep Disturbances] : sleep disturbances [Anxiety] : anxiety [Depression] : depression [Muscle Weakness] : muscle weakness [Negative] : Psychiatric [Fever] : no fever [Chills] : no chills [Eye Pain] : no eye pain [Loss Of Hearing] : no hearing loss [Palpitations] : no palpitations [Cough] : no cough [SOB on Exertion] : no shortness of breath during exertion [Joint Swelling] : no joint swelling [Skin Lesions] : no skin lesions [Limb Weakness] : no limb weakness [Easy Bruising] : no tendency for easy bruising [FreeTextEntry2] : 86 lb weight loss since April 2022 intentional -- l - wt stable [FreeTextEntry3] : denies inflammatory eye [FreeTextEntry5] : ?? CHB, need to review ECG- CV w/u in progress [FreeTextEntry6] : chronic desaturation while sleeping, requires HS O2 - not discussed today [FreeTextEntry7] : abdominal pain still present in LUQ, but is significantly better than previously- RESOLVED- and now reports BRBPR  [FreeTextEntry8] : renal calculi and dyspareunia - not discussed  [FreeTextEntry9] : NEW R 4PIP dactylitis only here appreciated though continues pain "everywhere"  [de-identified] : dry cracked skin on finger tips / and feet.. intermittently- NO psoriasis.. no nail pitting, mild onycholysis, and hyperkeratosis++ worse in feet  [de-identified] : due to pain, balance issues

## 2025-06-26 NOTE — REVIEW OF SYSTEMS
[Recent Weight Loss (___ Lbs)] : recent [unfilled] ~Ulb weight loss [Heart Rate Is Slow] : slow heart rate [Chest Pain] : chest pain [Shortness Of Breath] : shortness of breath [Orthopnea] : orthopnea [Abdominal Pain] : abdominal pain [Constipation] : constipation [Diarrhea] : diarrhea [Heartburn] : heartburn [Dysuria] : dysuria [Pelvic Pain] : pelvic pain [As Noted in HPI] : as noted in HPI [Arthralgias] : arthralgias [Joint Pain] : joint pain [Joint Stiffness] : joint stiffness [Difficulty Walking] : difficulty walking [Sleep Disturbances] : sleep disturbances [Anxiety] : anxiety [Depression] : depression [Muscle Weakness] : muscle weakness [Negative] : Psychiatric [Fever] : no fever [Chills] : no chills [Eye Pain] : no eye pain [Loss Of Hearing] : no hearing loss [Palpitations] : no palpitations [Cough] : no cough [SOB on Exertion] : no shortness of breath during exertion [Joint Swelling] : no joint swelling [Skin Lesions] : no skin lesions [Limb Weakness] : no limb weakness [Easy Bruising] : no tendency for easy bruising [FreeTextEntry2] : 86 lb weight loss since April 2022 intentional -- l - wt stable [FreeTextEntry3] : denies inflammatory eye [FreeTextEntry5] : ?? CHB, need to review ECG- CV w/u in progress [FreeTextEntry6] : chronic desaturation while sleeping, requires HS O2 - not discussed today [FreeTextEntry7] : abdominal pain still present in LUQ, but is significantly better than previously- RESOLVED- and now reports BRBPR  [FreeTextEntry8] : renal calculi and dyspareunia - not discussed  [FreeTextEntry9] : NEW R 4PIP dactylitis only here appreciated though continues pain "everywhere"  [de-identified] : dry cracked skin on finger tips / and feet.. intermittently- NO psoriasis.. no nail pitting, mild onycholysis, and hyperkeratosis++ worse in feet  [de-identified] : due to pain, balance issues
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
## 2025-07-14 NOTE — REVIEW OF SYSTEMS
[Recent Weight Loss (___ Lbs)] : recent [unfilled] ~Ulb weight loss [Heart Rate Is Slow] : slow heart rate [Chest Pain] : chest pain [Shortness Of Breath] : shortness of breath [Orthopnea] : orthopnea [Abdominal Pain] : abdominal pain [Constipation] : constipation [Diarrhea] : diarrhea [Heartburn] : heartburn [Dysuria] : dysuria [Pelvic Pain] : pelvic pain [As Noted in HPI] : as noted in HPI [Arthralgias] : arthralgias [Joint Pain] : joint pain [Joint Stiffness] : joint stiffness [Difficulty Walking] : difficulty walking [Sleep Disturbances] : sleep disturbances [Anxiety] : anxiety [Depression] : depression [Muscle Weakness] : muscle weakness [Negative] : Psychiatric [Fever] : no fever [Chills] : no chills [Eye Pain] : no eye pain [Loss Of Hearing] : no hearing loss [Palpitations] : no palpitations [Cough] : no cough [SOB on Exertion] : no shortness of breath during exertion [Joint Swelling] : no joint swelling [Skin Lesions] : no skin lesions [Limb Weakness] : no limb weakness [Easy Bruising] : no tendency for easy bruising [FreeTextEntry2] : 86 lb weight loss since April 2022 intentional -- l - wt stable [FreeTextEntry3] : denies inflammatory eye [FreeTextEntry5] : ?? CHB, need to review ECG- CV w/u in progress???  [FreeTextEntry6] : chronic desaturation while sleeping, requires HS O2 - not discussed today- ?? sleep study- not done [FreeTextEntry7] : abdominal pain still present in LUQ, but is significantly better than previously- RESOLVED- and now reports BRBPR  [FreeTextEntry8] : renal calculi and dyspareunia - not discussed  [FreeTextEntry9] : NEW R 4PIP dactylitis ++ still- only here appreciated though continues pain "everywhere"  [de-identified] : dry cracked skin on finger tips / and feet.. intermittently- NO psoriasis.. no nail pitting, mild onycholysis, and hyperkeratosis++ worse in feet  [de-identified] : due to pain, balance issues  [de-identified] : persistent anemia, worse lately

## 2025-07-14 NOTE — HISTORY OF PRESENT ILLNESS
[FreeTextEntry1] : Patient at home, has consented to telehealth video visit today  Provider:  Ze Cassidy [facility] [id number]  Verbal consent given on 07/14/2025 at 11:36 by ERIC IQBAL, ~  ~. TELedoc  L scapular pain severe - with night time pain - at this point has continued with max dose kraydom..and occas extra 5 mg prednisone  - .7/2/25 thoracic MR worse with movement "can't catch breath" need to review (hasn't been read yet) - taking 5-10 mg prednisone every day...2-3 x wk at 10 mg (didn't increase as directed to 10 mg) - Pending updated CTScan chest to assess LLL infiltrate.. Has not made appt with pulm- and needs to have updated PFTs  - didn't start MTX pending results of imaging.   - UPdated XR SI joints:  moderate R Si joint/ L SI joint mild arthritic changes  - Continues pregabalin stable dose but limited benefit at this time.  Updated labs 6/25/25 Hb 8.5 (from 10.0) with MCV 96, nl ESR/ CRP, UA, Fe 26 and only 6% sat with ferritin 22, AST 63, ALT 58 (previously nl LFTs- admits to high dose NSAIDs 2/2 severe pain)- ?? ever f/u GI.  EGD/ Colonscopy- cx neg for para - ?? wellbutrin: not taking anything too many other medications.  Frustrated in general with entire HC system.  Denies SI at this time  - Continue Tymlos for 22 mnths more.. though 6/25 likely - report immediately any acute renal calculi/ may need to consider renal US..  - f/u GI/ hem onc for persistent Anemia.. and updated scans as needed - f/u regarding GI w/u and wt / and chronic recurrent parasitic infection (is this active, should see ID if +, not mentioned today- didn't get to new ID)  - repeatedly encouraged to taper off Kratom but attempts to taper in past failed, brief trial oxy, not effective recently.  Will continue on current regimen... continues w/ MM as well- from [place]  - did speak with Dr Monzon... no significant change.. agrees with assessments as noted below and also attempting to get patient off Kratom Updated imaging..  T spine XR 3/7/25 T 10-11 vertebral fx, age indeterminant MR ordered Hand XR 3/7/25 totally nl   Acute parasitic infection:  again still feels active struggling with issues r/t  parasitic infection- despite course of  Ivermectin 3 tabs twice day- NOT effective- Most recent stool sample negative and CBC w/ diff nl eosinophils (but on pred at the time).  Previously Albendazole for 2 wks.. Very anxious about the return.. looking to connect to ID, tried to reach [facility] provider, no answer Dr Thomas -> not able to get appointment.  Working with GI at [facility] - Abd pain improved.  Actually had nearly 10+ wt gain. Has stopped Mounjaro and phenteramine.   Meds Taking Tymlos every day - Labs 10/9/24: collagen type 1 C-telo 1401pg/mL # 6 pens thus far - need to check  We have not renewed Lyrica - interested in switching to Gabapentin - she resists this and would prefer Lyrica, thinks it works best Takes 400mg Lyrica per day - 100mg morning, 100mg afternoon, 200mg evening Still takes 25mg spironolactone for edema Topiramate 1x per day 50mg Takes 2x 10mg Cyclobenzaprine at night (stopped over past month- ran out)  Prednisone 5 mg q am, occas 2nd dose   - has lichen sclerosus of vaginal area...previously was on medication and the issue resolved, but active now    1) Chronic pain / FM chronic opiates (Kratom)  2) Obesity s/P RyGB complicated but ventral hernia / SBO 12/22  3) TONY- recurrent  4) Recurrent/ persistent parasitic infection ______________________________________________________________________ f/u visit 55-year-old  female with a history of depression initially seen in October of 2016 with a history of a positive SILVIA and to rule out possible autoimmune disease. At that time she was ruled out for an autoimmune process but was noted to have a sedimentation rate of 35 and a CRP which was mildly elevated. Since that initial evaluation she is now off methadone, she admits to using an over-the-counter pain reliever called KRATOM. She is using oxycodone and upto motrin 800 mg tid.   She admits to joint pains, fatigue, poor sleep, prolonged morning stiffness, night sweats. She denies alopecia, oral ulcers, sicca symptoms or Raynauds. She denies psoriasis or colitis.She has now resumed lyrica with some relief.  She is concerned about the inflammatory markers and about her underlying symptoms. She is concerned about the lack of energy and the constant pain that she is in. She rates her current pain and fatigue at a 7/10. She denies fevers chills or night sweats. She has an average appetite without any weight loss. She has had an extensive work up with me her labs reveal A negative SILVIA, all specific serologies are negative and her sedimentation rate is in the 30s which is normal for her age and gender. She had imaging done as well which is more consistent with osteoarthritis and inflammatory arthritis. She had a bone density test that was read as osteoporosis. Besides being postmenopausal she does not have any other risk factors for osteoporosis.

## 2025-07-14 NOTE — PHYSICAL EXAM
[General Appearance - Alert] : alert [General Appearance - Well Nourished] : well nourished [General Appearance - Well Developed] : well developed [Outer Ear] : the ears and nose were normal in appearance [Examination Of The Oral Cavity] : the lips and gums were normal [] : no respiratory distress [Respiration, Rhythm And Depth] : normal respiratory rhythm and effort [Exaggerated Use Of Accessory Muscles For Inspiration] : no accessory muscle use [Cervical Lymph Nodes Enlarged Posterior Bilaterally] : posterior cervical [Cervical Lymph Nodes Enlarged Anterior Bilaterally] : anterior cervical [Supraclavicular Lymph Nodes Enlarged Bilaterally] : supraclavicular [Oriented To Time, Place, And Person] : oriented to person, place, and time [Impaired Insight] : insight and judgment were intact [Affect] : the affect was normal [Abnormal Walk] : normal gait [Musculoskeletal - Swelling] : no joint swelling seen [Motor Tone] : muscle strength and tone were normal [FreeTextEntry1] : (last visit) still seen on video- R MCP/ MTP TTP, R 4-5 PIP exquisite pain and bogginess - warmth- obvious swelling;  - Last visit.. Active inflammation of hands improved today. R 4 PIP overt swelling pain- dactylitis.. - despite pain does have nearly fROM neck, shoulders, elbows, wrists, hands, hips, knees, ankles and feet. - ++ tender points "everywhere" worse over spine; BL achilles TTP - fullness